# Patient Record
Sex: FEMALE | Race: WHITE
[De-identification: names, ages, dates, MRNs, and addresses within clinical notes are randomized per-mention and may not be internally consistent; named-entity substitution may affect disease eponyms.]

---

## 2023-03-13 ENCOUNTER — HOSPITAL ENCOUNTER (OUTPATIENT)
Dept: HOSPITAL 75 - RAD | Age: 43
End: 2023-03-13
Attending: NURSE PRACTITIONER
Payer: COMMERCIAL

## 2023-03-13 VITALS — WEIGHT: 175.27 LBS | HEIGHT: 64.02 IN | BODY MASS INDEX: 29.92 KG/M2

## 2023-03-13 DIAGNOSIS — Z98.82: ICD-10-CM

## 2023-03-13 DIAGNOSIS — R92.8: Primary | ICD-10-CM

## 2023-03-13 PROCEDURE — 19084 BX BREAST ADD LESION US IMAG: CPT

## 2023-03-13 PROCEDURE — 77065 DX MAMMO INCL CAD UNI: CPT

## 2023-03-13 PROCEDURE — 19083 BX BREAST 1ST LESION US IMAG: CPT

## 2023-03-13 NOTE — DIAGNOSTIC IMAGING REPORT
INDICATION: Abnormal left mammogram. The patient presents for

ultrasound-guided left breast biopsy.



Patient brought to the sonographic suite and placed on the table

in the supine position. Ultrasound imaging of the left breast was

performed to evaluate appropriate entry site. Left breast was

then prepped and draped in usual sterile fashion. Small amount of

1% lidocaine was utilized for local anesthesia. A total of 3 core

biopsies were obtained through the ovoid hypoechoic lesion at the

9 o'clock location of the left breast, utilizing 14-gauge Achieve

needle. An S marker clip was then deployed. Hemostasis was

obtained using manual compression. Patient tolerated the

procedure well.



IMPRESSION: Successful ultrasound-guided core biopsy of the ovoid

region of hypoechogenicity at the 9 o'clock location of the left

breast. Pathology results are currently pending.



Dictated by: 



  Dictated on workstation # [id number]

## 2023-03-13 NOTE — DIAGNOSTIC IMAGING REPORT
INDICATION: 

Abnormal left mammogram. Patient presents for an ultrasound

guided biopsy.



DETAILS OF THE PROCEDURE:

The patient was brought to the sonographic suite and placed on

the table in the supine position. Ultrasound imaging of the left

breast was performed to evaluate for an appropriate entry site.

The left breast was then prepped and draped in the usual sterile

fashion. A small amount of 1% lidocaine was utilized for local

anesthesia. A total of four core biopsies was made of an area of

somewhat rounded hypoechogenicity in the retroareolar left breast

utilizing a 14-gauge Achieve needle. An R marker clip was then

deployed. The patient tolerated the procedure well and was sent

for a post procedure mammogram in satisfactory condition.



IMPRESSION: 

Successful ultrasound guided core biopsy of a rounded mass-like

area of hypoechogenicity in the retroareolar aspect of the left

breast. Pathology results are currently pending.



Dictated by: 



  Dictated on workstation # [id number]

## 2023-03-13 NOTE — DIAGNOSTIC IMAGING REPORT
INDICATION: 

Left breast biopsy with ultrasound. This study is performed to

evaluate clip locations.



FINDINGS:

Unilateral left CC and ML 2D mammography was performed after the

patient underwent ultrasound-guided left breast biopsy. The S

marker clip is located centrally within the dominant

circumscribed lesion in her left breast, labeled as 9 o'clock on

the ultrasound-guided biopsy. There is an R marker clip in the

retroareolar left breast.



IMPRESSION: 

Marker clip placements, status post two ultrasound-guided

biopsies, as described with one within the 9 o'clock location of

the left breast and one within the retroareolar left breast.



Dictated by: 



  Dictated on workstation # [id number]

## 2023-09-18 ENCOUNTER — HOSPITAL ENCOUNTER (OUTPATIENT)
Dept: HOSPITAL 75 - CARD | Age: 43
End: 2023-09-18
Attending: PHYSICIAN ASSISTANT
Payer: COMMERCIAL

## 2023-09-18 VITALS — WEIGHT: 169.76 LBS | HEIGHT: 61.81 IN | BODY MASS INDEX: 31.24 KG/M2

## 2023-09-18 VITALS — SYSTOLIC BLOOD PRESSURE: 139 MMHG | DIASTOLIC BLOOD PRESSURE: 101 MMHG

## 2023-09-18 DIAGNOSIS — R00.2: ICD-10-CM

## 2023-09-18 DIAGNOSIS — I25.10: Primary | ICD-10-CM

## 2023-09-18 DIAGNOSIS — I10: ICD-10-CM

## 2023-09-18 PROCEDURE — 93017 CV STRESS TEST TRACING ONLY: CPT

## 2023-09-18 PROCEDURE — 78452 HT MUSCLE IMAGE SPECT MULT: CPT

## 2023-09-18 NOTE — CARDIOLOGY STRESS TEST REPORT
Stress Test Report


Date of Procedure/Referring:


Date of Procedure:  Sep 18, 2023


PCP





Admitting Physician


Admitting Physician:


 








Attending Physician:


Monserrat Andersen





Baseline Heart Rate:


58





Baseline Blood Pressure:


Blood Pressure Systolic:  139


Blood Pressure Diastolic:  101





Vital Signs








  Date Time  Temp Pulse Resp B/P (MAP) Pulse Ox O2 Delivery O2 Flow Rate FiO2


 


9/18/23 09:20  58  139/101 (114)    








Baseline Vital Signs





Vital Signs








  Date Time  Temp Pulse Resp B/P (MAP) Pulse Ox O2 Delivery O2 Flow Rate FiO2


 


9/18/23 09:20  58  139/101 (114)    











Baseline EKG:


Baseline EKG:  NSR





Summary:


After explaining the procedure and details to the patient, she  signed the 

consent and was brought to the stress nuclear laboratory.





Patient exercised on standard Mike protocol, EKG, heart rate and blood pressure

were monitored continuously, resting and stress doses of radio tracer were 

injected, imaging was acquired and reviewed in the short axis, horizontal long 

axis and vertical long axis views





Patient was able to exercise for a total of 9.30  minutes on Mike protocol,  

METs 10.3


Maximum heart rate 155      


Maximum blood pressure 192/52       


Stress EKG, Minimal nondiagnostic changes


Recovery EKG, Return to baseline


TID:  1


SSS:  1


SDS:  1


EF:  69





Conclusion:


Good exercise tolerance for 9 minutes and 30 seconds on standard Mike protocol,

10.3 METS achieving 86% of maximal expected heart rate


Appropriate heart rate response to exercise with hypertensive response to 

exercise return to baseline during recovery


Nondiagnostic EKG changes with exercise return to baseline during recovery


No significant ischemia or infarction noted on SPECT images


Normal left ventricular size, ejection fraction 69%





Copy


Copies To 1:   [facility]/JING CARDENAS MD              Sep 18, 2023 17:09